# Patient Record
Sex: FEMALE | Race: WHITE | NOT HISPANIC OR LATINO | URBAN - METROPOLITAN AREA
[De-identification: names, ages, dates, MRNs, and addresses within clinical notes are randomized per-mention and may not be internally consistent; named-entity substitution may affect disease eponyms.]

---

## 2021-02-14 ENCOUNTER — IMMUNIZATIONS (OUTPATIENT)
Dept: FAMILY MEDICINE CLINIC | Facility: HOSPITAL | Age: 86
End: 2021-02-14

## 2021-02-14 DIAGNOSIS — Z23 ENCOUNTER FOR IMMUNIZATION: Primary | ICD-10-CM

## 2021-02-14 PROCEDURE — 0001A SARS-COV-2 / COVID-19 MRNA VACCINE (PFIZER-BIONTECH) 30 MCG: CPT

## 2021-02-14 PROCEDURE — 91300 SARS-COV-2 / COVID-19 MRNA VACCINE (PFIZER-BIONTECH) 30 MCG: CPT

## 2021-03-05 ENCOUNTER — IMMUNIZATIONS (OUTPATIENT)
Dept: FAMILY MEDICINE CLINIC | Facility: HOSPITAL | Age: 86
End: 2021-03-05

## 2021-03-05 DIAGNOSIS — Z23 ENCOUNTER FOR IMMUNIZATION: Primary | ICD-10-CM

## 2021-03-05 PROCEDURE — 91300 SARS-COV-2 / COVID-19 MRNA VACCINE (PFIZER-BIONTECH) 30 MCG: CPT

## 2021-03-05 PROCEDURE — 0002A SARS-COV-2 / COVID-19 MRNA VACCINE (PFIZER-BIONTECH) 30 MCG: CPT

## 2022-02-03 ENCOUNTER — TELEPHONE (OUTPATIENT)
Dept: GASTROENTEROLOGY | Facility: CLINIC | Age: 87
End: 2022-02-03

## 2022-02-03 NOTE — TELEPHONE ENCOUNTER
Dr Nikki Thacker was last seen in 2010 for egd-(report is scanned in) Dr Lopez Burn office called for us to sched egd for pt due to weight loss and dysphagia  Office note from 2/3/22 is scanned in-ok to schedule egd?

## 2022-02-04 NOTE — TELEPHONE ENCOUNTER
Noted   If she calls back she should be scheduled at [facility], not here given age and potential need for balloon  She also really should be seen by any provider first given her age and symptoms

## 2023-01-16 ENCOUNTER — TELEPHONE (OUTPATIENT)
Dept: UROLOGY | Facility: AMBULATORY SURGERY CENTER | Age: 88
End: 2023-01-16

## 2023-01-16 NOTE — TELEPHONE ENCOUNTER
Please Triage  New Patient    What is the reason for the patient’s appointment? Frequent UTI and pt was on several antibiotics since November and still has symptoms     What office location does the patient prefer? [place]     Imaging/Lab Results:    Do we accept the patient's insurance or is the patient Self-Pay? Yes     Insurance Provider: [facility]   Plan Type/Number:  Member ID#: Has the patient had any previous Urologist(s)? No     Have patient records been requested? If not are records showing in [address] [place] [address] Rd: records in [facility]     Has the patient had any outside testing done? No    Does the patient have a personal history of cancer?  No     Pt call [phone]

## 2023-01-30 RX ORDER — PREGABALIN 100 MG/1
100 CAPSULE ORAL 3 TIMES DAILY
COMMUNITY
Start: 2022-12-18

## 2023-01-30 RX ORDER — RIVAROXABAN 20 MG/1
20 TABLET, FILM COATED ORAL
COMMUNITY
Start: 2023-01-06

## 2023-01-30 RX ORDER — ESTRADIOL 0.1 MG/G
CREAM VAGINAL
COMMUNITY
Start: 2022-11-07

## 2023-01-30 RX ORDER — MIRABEGRON 25 MG/1
25 TABLET, FILM COATED, EXTENDED RELEASE ORAL DAILY
COMMUNITY
Start: 2023-01-06

## 2023-01-30 RX ORDER — METOPROLOL SUCCINATE 25 MG/1
25 TABLET, EXTENDED RELEASE ORAL 2 TIMES DAILY
COMMUNITY
Start: 2022-12-06

## 2023-01-30 RX ORDER — TRAMADOL HYDROCHLORIDE 50 MG/1
TABLET ORAL
COMMUNITY
Start: 2022-11-22

## 2023-01-30 RX ORDER — LEVOTHYROXINE SODIUM 88 UG/1
88 TABLET ORAL DAILY
COMMUNITY
Start: 2022-12-08

## 2023-01-30 RX ORDER — SIMVASTATIN 40 MG
40 TABLET ORAL DAILY
COMMUNITY
Start: 2023-01-05

## 2023-01-30 RX ORDER — LOSARTAN POTASSIUM 50 MG/1
50 TABLET ORAL DAILY
COMMUNITY
Start: 2022-10-30

## 2023-02-03 ENCOUNTER — OFFICE VISIT (OUTPATIENT)
Dept: UROLOGY | Facility: MEDICAL CENTER | Age: 88
End: 2023-02-03

## 2023-02-03 VITALS
OXYGEN SATURATION: 96 % | DIASTOLIC BLOOD PRESSURE: 74 MMHG | WEIGHT: 163 LBS | SYSTOLIC BLOOD PRESSURE: 138 MMHG | HEART RATE: 79 BPM

## 2023-02-03 DIAGNOSIS — N39.0 RECURRENT UTI: Primary | ICD-10-CM

## 2023-02-03 DIAGNOSIS — N89.8 VAGINAL PRURITUS: ICD-10-CM

## 2023-02-03 LAB
SL AMB  POCT GLUCOSE, UA: ABNORMAL
SL AMB LEUKOCYTE ESTERASE,UA: ABNORMAL
SL AMB POCT BILIRUBIN,UA: ABNORMAL
SL AMB POCT BLOOD,UA: ABNORMAL
SL AMB POCT CLARITY,UA: CLEAR
SL AMB POCT COLOR,UA: YELLOW
SL AMB POCT KETONES,UA: ABNORMAL
SL AMB POCT NITRITE,UA: ABNORMAL
SL AMB POCT PH,UA: 5.5
SL AMB POCT SPECIFIC GRAVITY,UA: 1.01
SL AMB POCT URINE PROTEIN: ABNORMAL
SL AMB POCT UROBILINOGEN: 0.2

## 2023-02-03 RX ORDER — METHENAMINE HIPPURATE 1000 MG/1
1 TABLET ORAL DAILY
COMMUNITY
Start: 2023-01-19

## 2023-02-03 RX ORDER — SENNOSIDES 8.6 MG
650 CAPSULE ORAL EVERY 8 HOURS PRN
COMMUNITY

## 2023-02-03 RX ORDER — FLUCONAZOLE 150 MG/1
150 TABLET ORAL ONCE
Qty: 1 TABLET | Refills: 0 | Status: SHIPPED | OUTPATIENT
Start: 2023-02-03 | End: 2023-02-03

## 2023-02-03 NOTE — PROGRESS NOTES
2/3/2023      Chief Complaint   Patient presents with   • Urinary Tract Infection         Assessment and Plan    80 y o  female new patient    1  Recurrent UTIs  · Urine today: trace leuks, trace blood  · US Dec 2022 unremarkable  · Continue Hipprex from OBGYN  · Standing urine testing ordered  · Reviewed proper  hygiene  · Reviewed importance of 40-60 oz of water daily  · Follow up in 3 months  2  Yeast infection  · Secondary to repeat Abx for recent infections  · No improvement on Monistat  · Diflucan sent to pharmacy  History of Present Illness  Nico Carias is a 80 y o  female here for evaluation of recurrent UTIs  Please see recent cultures below  Patient has previous history of mesh placement for urinary incontinence without improvement  She wears pads during the day and night for which she changes multiple times a day even if dryl  She was previously managed on topical estrogen but had difficulty applying topically on her own so she has had Estring inserted for Q3 months  She underwent cystoscopy evaluate at [facility] [place] on 12/5/22 which was unremarkable as well as US on 12/21/22 which was unremarkable as well  She was recently started on Hipprex 1 g daily x 6 months by her OBGYN  She has had ongoing vaginal pruritus following treatments of her UTIs  She notes only occasional dysuria  She denies flank or abdominal pain  She denies any gross hematuria, fevers, or chills  Review of Systems   Constitutional: Negative for chills and fever  HENT: Negative  Respiratory: Negative  Cardiovascular: Negative  Gastrointestinal: Negative for abdominal pain, nausea and vomiting  Genitourinary: Positive for dysuria (intermittent), enuresis, frequency and urgency  Negative for difficulty urinating, flank pain, hematuria and vaginal pain (but pruritus)  Mixed incontinence  Nocturia   Musculoskeletal: Negative  Skin: Negative  Neurological: Negative        Vitals  Vitals: 02/03/23 1001   BP: 138/74   Pulse: 79   SpO2: 96%   Weight: 73 9 kg (163 lb)       Physical Exam  Vitals reviewed  Constitutional:       General: She is not in acute distress  Appearance: Normal appearance  She is not ill-appearing, toxic-appearing or diaphoretic  HENT:      Head: Normocephalic and atraumatic  Eyes:      Conjunctiva/sclera: Conjunctivae normal    Pulmonary:      Effort: Pulmonary effort is normal  No respiratory distress  Abdominal:      General: There is no distension  Palpations: Abdomen is soft  Tenderness: There is no abdominal tenderness  There is no right CVA tenderness, left CVA tenderness, guarding or rebound  Musculoskeletal:         General: Normal range of motion  Cervical back: Normal range of motion  Skin:     General: Skin is warm and dry  Neurological:      General: No focal deficit present  Mental Status: She is alert and oriented to person, place, and time  Psychiatric:         Mood and Affect: Mood normal          Behavior: Behavior normal          Thought Content: Thought content normal          Judgment: Judgment normal        Past History  Past Medical History:   Diagnosis Date   • [facility]-fib ([facility] )    • Hypertension    • Hypothyroidism    • Peripheral neuropathy      Social History     Socioeconomic History   • Marital status:       Spouse name: Not on file   • Number of children: Not on file   • Years of education: Not on file   • Highest education level: Not on file   Occupational History   • Not on file   Tobacco Use   • Smoking status: Never   • Smokeless tobacco: Never   Vaping Use   • Vaping Use: Never used   Substance and Sexual Activity   • Alcohol use: Never   • Drug use: Never   • Sexual activity: Not on file   Other Topics Concern   • Not on file   Social History Narrative   • Not on file     Social Determinants of Health     Financial Resource Strain: Not on file   Food Insecurity: Not on file   Transportation Needs: Not on file   Physical Activity: Not on file   Stress: Not on file   Social Connections: Not on file   Intimate Partner Violence: Not on file   Housing Stability: Not on file     Social History     Tobacco Use   Smoking Status Never   Smokeless Tobacco Never     No family history on file      The following portions of the patient's history were reviewed and updated as appropriate: allergies, current medications, past medical history, past social history, past surgical history and problem list     Results  Recent Results (from the past 1 hour(s))   POCT urine dip auto non-scope    Collection Time: 02/03/23 10:07 AM   Result Value Ref Range     COLOR,UA yellow     CLARITY,UA clear     SPECIFIC GRAVITY,UA 1 015      PH,UA 5 5     LEUKOCYTE ESTERASE,UA trace     NITRITE,UA neg     GLUCOSE, UA neg     KETONES,UA neg     BILIRUBIN,UA neg     BLOOD,UA trace     POCT URINE PROTEIN trace     SL AMB POCT UROBILINOGEN 0 2    ]  No results found for: PSA  No results found for: GLUCOSE, CALCIUM, NA, K, CO2, CL, BUN, CREATININE  No results found for: WBC, HGB, HCT, MCV, PLT    Nini Broussard PA-C

## 2023-02-07 ENCOUNTER — TELEPHONE (OUTPATIENT)
Dept: OTHER | Facility: OTHER | Age: 88
End: 2023-02-07

## 2023-02-07 NOTE — TELEPHONE ENCOUNTER
Patients daughter is requesting a call back from [facility]  Patients daughter states her mother is still itchy and wants to discuss other options

## 2023-02-15 NOTE — TELEPHONE ENCOUNTER
Looks like pt was dx with vaginal yeast infection and failed monistat and diflucan   Will need to follow up with GYN

## 2023-05-03 ENCOUNTER — OFFICE VISIT (OUTPATIENT)
Dept: UROLOGY | Facility: MEDICAL CENTER | Age: 88
End: 2023-05-03
Payer: COMMERCIAL

## 2023-05-03 VITALS
OXYGEN SATURATION: 75 % | WEIGHT: 163 LBS | HEIGHT: 60 IN | DIASTOLIC BLOOD PRESSURE: 70 MMHG | BODY MASS INDEX: 32 KG/M2 | SYSTOLIC BLOOD PRESSURE: 130 MMHG | HEART RATE: 76 BPM

## 2023-05-03 DIAGNOSIS — N39.0 RECURRENT UTI: Primary | ICD-10-CM

## 2023-05-03 DIAGNOSIS — N89.8 VAGINAL PRURITUS: ICD-10-CM

## 2023-05-03 PROCEDURE — 81003 URINALYSIS AUTO W/O SCOPE: CPT | Performed by: PHYSICIAN ASSISTANT

## 2023-05-03 PROCEDURE — 99213 OFFICE O/P EST LOW 20 MIN: CPT | Performed by: PHYSICIAN ASSISTANT

## 2023-05-03 RX ORDER — CEPHALEXIN 250 MG/1
250 CAPSULE ORAL
Qty: 90 CAPSULE | Refills: 0 | Status: SHIPPED | OUTPATIENT
Start: 2023-05-03 | End: 2023-08-01

## 2023-05-03 RX ORDER — FUROSEMIDE 20 MG/1
20 TABLET ORAL DAILY
COMMUNITY
Start: 2023-03-15

## 2023-05-03 RX ORDER — PSYLLIUM HUSK 3.4 G/7G
POWDER ORAL
COMMUNITY
Start: 2023-04-17

## 2023-05-03 RX ORDER — ESTRADIOL 2 MG/1
RING VAGINAL
COMMUNITY
Start: 2023-03-07

## 2023-05-03 RX ORDER — HYDROCODONE BITARTRATE AND ACETAMINOPHEN 5; 325 MG/1; MG/1
TABLET ORAL
COMMUNITY
Start: 2023-04-10

## 2023-05-03 RX ORDER — LANOLIN ALCOHOL/MO/W.PET/CERES
3 CREAM (GRAM) TOPICAL
COMMUNITY
Start: 2023-03-15 | End: 2024-03-14

## 2023-05-03 NOTE — PROGRESS NOTES
5/3/2023      Chief Complaint   Patient presents with   • Recurrent Uti's         Assessment and Plan    80 y o  female  Managed by our office     1  Recurrent UTIs  • Urine today: trace leuks, negative nitrites or blood  • US Dec 2022 unremarkable  • Continue Hipprex from OBGYN  • Start oral cranberry and D-mannose supplements  • Standing urine testing ordered  • Continue proper  hygiene  • Reviewed importance of 40-60 oz of water daily  • Will start suppression antibiotics given persistent, symptomatic infections despite prevention regimen  Keflex 250 mg po QHS x 90 days sent to pharmacy  • Follow up in 3 months       2  Yeast infection  · Denies subsequent issues        History of Present Illness  Andres Sanchez is a 80 y o  female here for evaluation of recurrent UTIs  recurrent UTIs  Please see recent cultures below  Patient has previous history of mesh placement for urinary incontinence without improvement  She wears pads during the day and night for which she changes multiple times a day even if dryl  She was previously managed on topical estrogen but had difficulty applying topically on her own so she has had Estring inserted for Q3 months  She underwent cystoscopy evaluate at [facility] [place] on 12/5/22 which was unremarkable as well as US on 12/21/22 which was unremarkable as well  She has been on Hipprex from the OB/GYN  Patient had another E coli infection despite prevention regimen  Currently denies any symptoms  Patient and her family are agreeable to plan above  Urine testing -   >100K Ecoli ( 3/31/23)   UA micro negative (3/13/23)  >100K Ecoli (3/8/23)        Vitals  Vitals:    05/03/23 1108   BP: 130/70   BP Location: Left arm   Patient Position: Standing   Cuff Size: Large   Pulse: 76   SpO2: (!) 75%   Weight: 73 9 kg (163 lb)   Height: 5' (1 524 m)       Physical Exam  Vitals reviewed  Constitutional:       General: She is not in acute distress  Appearance: Normal appearance   She is obese  She is not ill-appearing, toxic-appearing or diaphoretic  HENT:      Head: Normocephalic and atraumatic  Eyes:      Conjunctiva/sclera: Conjunctivae normal    Pulmonary:      Effort: Pulmonary effort is normal  No respiratory distress  Abdominal:      General: There is no distension  Palpations: Abdomen is soft  Tenderness: There is no abdominal tenderness  There is no right CVA tenderness, left CVA tenderness, guarding or rebound  Musculoskeletal:         General: Normal range of motion  Cervical back: Normal range of motion  Skin:     General: Skin is warm and dry  Neurological:      General: No focal deficit present  Mental Status: She is alert and oriented to person, place, and time  Psychiatric:         Mood and Affect: Mood normal          Behavior: Behavior normal          Thought Content: Thought content normal          Judgment: Judgment normal        Past History  Past Medical History:   Diagnosis Date   • A-fib ([facility] )    • Hypertension    • Hypothyroidism    • Peripheral neuropathy      Social History     Socioeconomic History   • Marital status:       Spouse name: None   • Number of children: None   • Years of education: None   • Highest education level: None   Occupational History   • None   Tobacco Use   • Smoking status: Never   • Smokeless tobacco: Never   Vaping Use   • Vaping Use: Never used   Substance and Sexual Activity   • Alcohol use: Never   • Drug use: Never   • Sexual activity: None   Other Topics Concern   • None   Social History Narrative   • None     Social Determinants of Health     Financial Resource Strain: Not on file   Food Insecurity: Not on file   Transportation Needs: Not on file   Physical Activity: Not on file   Stress: Not on file   Social Connections: Not on file   Intimate Partner Violence: Not on file   Housing Stability: Not on file     Social History     Tobacco Use   Smoking Status Never   Smokeless Tobacco Never     No "family history on file  The following portions of the patient's history were reviewed and updated as appropriate: allergies, current medications, past medical history, past social history, past surgical history and problem list     Results  No results found for this or any previous visit (from the past 1 hour(s))  ]  No results found for: \"PSA\"  No results found for: \"GLUCOSE\", \"CALCIUM\", \"NA\", \"K\", \"CO2\", \"CL\", \"BUN\", \"CREATININE\"  No results found for: \"WBC\", \"HGB\", \"HCT\", \"MCV\", \"PLT\"    Martha Claire PA-C    "

## 2023-05-04 ENCOUNTER — TELEPHONE (OUTPATIENT)
Dept: UROLOGY | Facility: AMBULATORY SURGERY CENTER | Age: 88
End: 2023-05-04

## 2023-05-04 NOTE — TELEPHONE ENCOUNTER
Pt under care of: Trinidad Izaguirre    Last Seen: 5/3/23    Reason for call: Pt daughter called and stated at appt medications were changed but on AVS still listed as pt taking certain medications   Pt daughter is requesting call back to clarify medication changes    Pt can be reached at: [phone] [facility] (daughter)

## 2023-05-04 NOTE — TELEPHONE ENCOUNTER
I spoke with the patients daughter regarding this  She stated she was told to stop one of the medications and start the other one instead  Please advise  I do not see anything stating this

## 2023-05-05 NOTE — TELEPHONE ENCOUNTER
Addressed this morning with patient's daughter       Start 90 day suppression abx  Stop Hipprex   Can start Dmannose and oral cranberry     Thanks

## 2023-07-19 ENCOUNTER — OFFICE VISIT (OUTPATIENT)
Dept: UROLOGY | Facility: MEDICAL CENTER | Age: 88
End: 2023-07-19

## 2023-07-19 VITALS
BODY MASS INDEX: 32 KG/M2 | HEART RATE: 61 BPM | OXYGEN SATURATION: 90 % | WEIGHT: 163 LBS | SYSTOLIC BLOOD PRESSURE: 122 MMHG | HEIGHT: 60 IN | DIASTOLIC BLOOD PRESSURE: 82 MMHG

## 2023-07-19 DIAGNOSIS — N39.0 RECURRENT UTI: Primary | ICD-10-CM

## 2023-07-19 LAB
SL AMB  POCT GLUCOSE, UA: NORMAL
SL AMB LEUKOCYTE ESTERASE,UA: NORMAL
SL AMB POCT BILIRUBIN,UA: NORMAL
SL AMB POCT BLOOD,UA: NORMAL
SL AMB POCT CLARITY,UA: CLEAR
SL AMB POCT COLOR,UA: YELLOW
SL AMB POCT KETONES,UA: NORMAL
SL AMB POCT NITRITE,UA: NORMAL
SL AMB POCT PH,UA: 5.5
SL AMB POCT SPECIFIC GRAVITY,UA: 1.01
SL AMB POCT URINE PROTEIN: NORMAL
SL AMB POCT UROBILINOGEN: 0.2

## 2023-07-19 RX ORDER — LANOLIN ALCOHOL/MO/W.PET/CERES
1000 CREAM (GRAM) TOPICAL DAILY
COMMUNITY
Start: 2023-07-11

## 2023-07-19 NOTE — PROGRESS NOTES
7/19/2023      Chief Complaint   Patient presents with   • Follow-up     3 month f/u UTI med change          Assessment and Plan    80 y.o. female    1. Recurrent UTIs  • Urine today: neg leuks, nitrites, and trace blood   • US Dec 2022 unremarkable  • Continue Hipprex from OBGYN  • Continue oral cranberry and D-mannose supplements. • Standing urine testing ordered. • Continue proper  hygiene  • Reviewed importance of 40-60 oz of water daily. • Continue suppression antibiotics to completion. • Follow up in 3 months.      2. Yeast infection  · Monistat a few weeks ago, doing better since. · Will send Diflucan to pharmacy as needed given suppression Abx and increased risk of subsequent yeast infections. History of Present Illness  Isiah Valenzuela is a 80 y.o. female here for evaluation of recurrent UTIs. Patient has previous history of mesh placement for urinary incontinence without improvement. She wears pads during the day and night for which she changes multiple times a day even if dryl. She was previously managed on topical estrogen but had difficulty applying topically on her own so she has had Estring inserted for Q3 months. She underwent cystoscopy evaluate at [facility] [place] on 12/5/22 which was unremarkable as well as US on 12/21/22 which was unremarkable as well. She has been on Hipprex from the OB/GYN. Patient had another E coli infection despite prevention regimen. At her last visit, we started her on a 90-day suppression course of Keflex. They deny any subsequent episodes of UTIs but patient did experience one yeast infection which quickly cleared up with over-the-counter Monistat. She has been taking oral cranberry that may also have d-mannose in the supplement. If not her family plans on picking up d-mannose supplements to add on to her regimen. Otherwise patient is doing very well. Patient and her family are agreeable to plan above.       Vitals  Vitals:    07/19/23 1052   BP: 122/82 BP Location: Left arm   Patient Position: Sitting   Cuff Size: Adult   Pulse: 61   SpO2: 90%   Weight: 73.9 kg (163 lb)   Height: 5' (1.524 m)       Physical Exam  Vitals reviewed. Constitutional:       General: She is not in acute distress. Appearance: Normal appearance. She is obese. She is not ill-appearing, toxic-appearing or diaphoretic. HENT:      Head: Normocephalic and atraumatic. Eyes:      Conjunctiva/sclera: Conjunctivae normal.   Pulmonary:      Effort: Pulmonary effort is normal. No respiratory distress. Abdominal:      General: There is no distension. Palpations: Abdomen is soft. Tenderness: There is no abdominal tenderness. There is no right CVA tenderness, left CVA tenderness, guarding or rebound. Musculoskeletal:         General: Normal range of motion. Cervical back: Normal range of motion. Skin:     General: Skin is warm and dry. Neurological:      General: No focal deficit present. Mental Status: She is alert and oriented to person, place, and time. Psychiatric:         Mood and Affect: Mood normal.         Behavior: Behavior normal.         Thought Content: Thought content normal.         Judgment: Judgment normal.           Past History  Past Medical History:   Diagnosis Date   • A-fib ([address])    • Hypertension    • Hypothyroidism    • Peripheral neuropathy      Social History     Socioeconomic History   • Marital status:       Spouse name: None   • Number of children: None   • Years of education: None   • Highest education level: None   Occupational History   • None   Tobacco Use   • Smoking status: Never   • Smokeless tobacco: Never   Vaping Use   • Vaping Use: Never used   Substance and Sexual Activity   • Alcohol use: Never   • Drug use: Never   • Sexual activity: Not Currently   Other Topics Concern   • None   Social History Narrative   • None     Social Determinants of Health     Financial Resource Strain: Not on file   Food Insecurity: Not on file   Transportation Needs: Not on file   Physical Activity: Not on file   Stress: Not on file   Social Connections: Not on file   Intimate Partner Violence: Not on file   Housing Stability: Not on file     Social History     Tobacco Use   Smoking Status Never   Smokeless Tobacco Never     History reviewed. No pertinent family history. The following portions of the patient's history were reviewed and updated as appropriate: allergies, current medications, past medical history, past social history, past surgical history and problem list.    Results  No results found for this or any previous visit (from the past 1 hour(s)). ]  No results found for: "PSA"  No results found for: "GLUCOSE", "CALCIUM", "NA", "K", "CO2", "CL", "BUN", "CREATININE"  No results found for: "WBC", "HGB", "HCT", "MCV", "PLT"    Kimberly Cantrell PA-C

## 2023-09-14 ENCOUNTER — TELEPHONE (OUTPATIENT)
Age: 88
End: 2023-09-14

## 2023-09-14 DIAGNOSIS — N39.0 RECURRENT UTI: Primary | ICD-10-CM

## 2023-09-14 RX ORDER — CEPHALEXIN 250 MG/1
250 CAPSULE ORAL
Qty: 30 CAPSULE | Refills: 2 | Status: SHIPPED | OUTPATIENT
Start: 2023-09-14 | End: 2023-12-13

## 2023-09-14 NOTE — TELEPHONE ENCOUNTER
Pt's daughter michelle called stated that on her last OV the provider discussed that she will prescript the big pill for recurrent UTI if needed. Daughter requested a prescription of that one high dose pill.      Please review

## 2023-09-14 NOTE — TELEPHONE ENCOUNTER
Patient managed by AP team at the [facility]. She has a history of recurrent UTI. Patient's daughter called with complaints of burning with urination and frequency. Denies fever, chill or flank pain. Daughter states when patient was seen last, AP prescribed 90 capsules of Keflex as maintenance therapy. Pt needs refill. I did advise daughter that based on symptoms a urine culture is needed to rule out infection since she is symptomatic. Daughter will try and take patient in the next day or so for urine testing. She is asking for refill of Keflex       Orders placed for UA C&S to rule out urinary tract infection. Office will follow up as results become available usually within 48-72 hours. Patient encouraged to hydrate well with water and avoid bladder irritants. Patient instructed to call back with worsening symptoms, fever, chills, blood in the urine or difficulty urinating.

## 2023-10-11 ENCOUNTER — RA CDI HCC (OUTPATIENT)
Dept: OTHER | Facility: HOSPITAL | Age: 88
End: 2023-10-11

## 2023-10-11 NOTE — PROGRESS NOTES
[facility] coding opportunities       Chart reviewed, no opportunity found: CHART REVIEWED, NO OPPORTUNITY FOUND        Patients Insurance        Commercial Insurance: Reji Carson

## 2023-10-18 ENCOUNTER — OFFICE VISIT (OUTPATIENT)
Dept: UROLOGY | Facility: MEDICAL CENTER | Age: 88
End: 2023-10-18
Payer: COMMERCIAL

## 2023-10-18 VITALS
OXYGEN SATURATION: 100 % | BODY MASS INDEX: 32 KG/M2 | WEIGHT: 163 LBS | HEIGHT: 60 IN | HEART RATE: 75 BPM | DIASTOLIC BLOOD PRESSURE: 60 MMHG | SYSTOLIC BLOOD PRESSURE: 110 MMHG

## 2023-10-18 DIAGNOSIS — N39.0 RECURRENT UTI: ICD-10-CM

## 2023-10-18 PROCEDURE — 99213 OFFICE O/P EST LOW 20 MIN: CPT | Performed by: PHYSICIAN ASSISTANT

## 2023-10-18 RX ORDER — FLUTICASONE PROPIONATE 50 MCG
2 SPRAY, SUSPENSION (ML) NASAL DAILY
COMMUNITY
Start: 2023-09-22

## 2023-10-18 RX ORDER — CEPHALEXIN 500 MG/1
500 CAPSULE ORAL
Qty: 90 CAPSULE | Refills: 3 | Status: SHIPPED | OUTPATIENT
Start: 2023-10-18 | End: 2024-10-12

## 2023-10-18 NOTE — PROGRESS NOTES
10/18/2023      Chief Complaint   Patient presents with    Recurrent Uti's         Assessment and Plan    80 y.o. female managed by AP team    1. Recurrent UTI  2. Overactive bladder    I think the benefit outweighs the longterm risk at her age I recommend she continue the Keflex 500 mg daily suppression. She feels very well with no breakthrough symptoms in a few months. She feels Myrbetriq is also helpful for her overactivity and incontinence. There is a waiting period of her pharmacy for the Estring refill, so her urogynecology follow-up is on hold. She wishes to stop the hiprex since she is on keflex now to minimize pills per day. I refilled her prescriptions today she can follow-up PRN. History of Present Illness  Jg Chavez is a 80 y.o. female here for evaluation of 3-month follow-up recurrent coli UTI. She has history of mesh sling for urinary incontinence years ago. Uses Estring, Myrbetriq, Hiprex for UTI prevention and overactive bladder. She still wears a pad but not as much leakage that she used to have. Negative cystoscopy in their office last year. She has had recurrent E. coli urinary tract infections despite her prevention regimen. This year tried a 90-day suppression course of Keflex which dramatically improved her symptoms. He had a breakthrough after she came off and has now resumed daily  250 mg Keflex. She returns today with no complaints, feels well. uses monistat prn for vaginal itching no issues with that lately either. She continues to follow with her urogynecology team [facility] with Oral Leal PA-C whom I know personally. Review of Systems   Constitutional:  Negative for activity change, appetite change, chills, fever and unexpected weight change. HENT: Negative. Respiratory: Negative. Negative for shortness of breath. Cardiovascular: Negative. Negative for chest pain. Gastrointestinal:  Negative for abdominal pain, diarrhea, nausea and vomiting. Endocrine: Negative. Genitourinary:  Negative for decreased urine volume, difficulty urinating, dysuria, flank pain, frequency, hematuria and urgency. Musculoskeletal:  Negative for back pain and gait problem. Skin: Negative. Allergic/Immunologic: Negative. Neurological: Negative. Hematological:  Negative for adenopathy. Does not bruise/bleed easily. Vitals  Vitals:    10/18/23 1127   BP: 110/60   BP Location: Left arm   Patient Position: Sitting   Cuff Size: Large   Pulse: 75   SpO2: 100%   Weight: 73.9 kg (163 lb)   Height: 5' (1.524 m)       Physical Exam  Vitals and nursing note reviewed. Constitutional:       General: She is not in acute distress. Appearance: Normal appearance. She is well-developed. She is not diaphoretic. HENT:      Head: Normocephalic and atraumatic. Pulmonary:      Effort: Pulmonary effort is normal.   Musculoskeletal:      Right lower leg: No edema. Left lower leg: No edema. Skin:     General: Skin is warm and dry. Neurological:      General: No focal deficit present. Mental Status: She is alert and oriented to person, place, and time. Psychiatric:         Mood and Affect: Mood normal.         Speech: Speech normal.         Behavior: Behavior normal.           Past History  Past Medical History:   Diagnosis Date    A-fib ([address])     Hypertension     Hypothyroidism     Peripheral neuropathy      Social History     Socioeconomic History    Marital status:       Spouse name: None    Number of children: None    Years of education: None    Highest education level: None   Occupational History    None   Tobacco Use    Smoking status: Never    Smokeless tobacco: Never   Vaping Use    Vaping Use: Never used   Substance and Sexual Activity    Alcohol use: Never    Drug use: Never    Sexual activity: Not Currently   Other Topics Concern    None   Social History Narrative    None     Social Determinants of Health     Financial Resource Strain: Not on file   Food Insecurity: Not on file   Transportation Needs: Not on file   Physical Activity: Not on file   Stress: Not on file   Social Connections: Not on file   Intimate Partner Violence: Not on file   Housing Stability: Not on file     Social History     Tobacco Use   Smoking Status Never   Smokeless Tobacco Never     No family history on file. The following portions of the patient's history were reviewed and updated as appropriate: allergies, current medications, past medical history, past social history, past surgical history and problem list.    Results  No results found for this or any previous visit (from the past 1 hour(s)). ]  No results found for: "PSA"  No results found for: "GLUCOSE", "CALCIUM", "NA", "K", "CO2", "CL", "BUN", "CREATININE"  No results found for: "WBC", "HGB", "HCT", "MCV", "PLT"